# Patient Record
Sex: FEMALE | URBAN - METROPOLITAN AREA
[De-identification: names, ages, dates, MRNs, and addresses within clinical notes are randomized per-mention and may not be internally consistent; named-entity substitution may affect disease eponyms.]

---

## 2021-01-25 ENCOUNTER — HOSPITAL ENCOUNTER (OUTPATIENT)
Age: 36
Setting detail: SPECIMEN
Discharge: HOME | End: 2021-01-25
Payer: COMMERCIAL

## 2021-01-25 ENCOUNTER — OFFICE VISIT (OUTPATIENT)
Dept: OBGYN CLINIC | Age: 36
End: 2021-01-25
Payer: COMMERCIAL

## 2021-01-25 VITALS — SYSTOLIC BLOOD PRESSURE: 116 MMHG | HEART RATE: 68 BPM | WEIGHT: 154.5 LBS | DIASTOLIC BLOOD PRESSURE: 73 MMHG

## 2021-01-25 DIAGNOSIS — Z86.19 HISTORY OF HEPATITIS A: ICD-10-CM

## 2021-01-25 DIAGNOSIS — Z86.718 HISTORY OF BLOOD CLOTS: ICD-10-CM

## 2021-01-25 DIAGNOSIS — Z11.3 SCREENING FOR STD (SEXUALLY TRANSMITTED DISEASE): ICD-10-CM

## 2021-01-25 DIAGNOSIS — Z87.891 HISTORY OF SMOKING: ICD-10-CM

## 2021-01-25 DIAGNOSIS — I87.2 VENOUS INSUFFICIENCY OF BOTH LOWER EXTREMITIES: ICD-10-CM

## 2021-01-25 DIAGNOSIS — Z01.419 WOMEN'S ANNUAL ROUTINE GYNECOLOGICAL EXAMINATION: Primary | ICD-10-CM

## 2021-01-25 PROCEDURE — 99385 PREV VISIT NEW AGE 18-39: CPT | Performed by: ADVANCED PRACTICE MIDWIFE

## 2021-01-25 PROCEDURE — G8484 FLU IMMUNIZE NO ADMIN: HCPCS | Performed by: ADVANCED PRACTICE MIDWIFE

## 2021-01-25 SDOH — HEALTH STABILITY: MENTAL HEALTH: HOW MANY STANDARD DRINKS CONTAINING ALCOHOL DO YOU HAVE ON A TYPICAL DAY?: NOT ASKED

## 2021-01-25 SDOH — ECONOMIC STABILITY: FOOD INSECURITY: WITHIN THE PAST 12 MONTHS, THE FOOD YOU BOUGHT JUST DIDN'T LAST AND YOU DIDN'T HAVE MONEY TO GET MORE.: NEVER TRUE

## 2021-01-25 SDOH — ECONOMIC STABILITY: TRANSPORTATION INSECURITY
IN THE PAST 12 MONTHS, HAS THE LACK OF TRANSPORTATION KEPT YOU FROM MEDICAL APPOINTMENTS OR FROM GETTING MEDICATIONS?: NO

## 2021-01-25 ASSESSMENT — PATIENT HEALTH QUESTIONNAIRE - PHQ9
SUM OF ALL RESPONSES TO PHQ QUESTIONS 1-9: 0
SUM OF ALL RESPONSES TO PHQ9 QUESTIONS 1 & 2: 0
1. LITTLE INTEREST OR PLEASURE IN DOING THINGS: 0

## 2021-01-25 ASSESSMENT — ENCOUNTER SYMPTOMS
ALLERGIC/IMMUNOLOGIC NEGATIVE: 1
EYES NEGATIVE: 1
GASTROINTESTINAL NEGATIVE: 1
RESPIRATORY NEGATIVE: 1

## 2021-01-25 NOTE — PROGRESS NOTES
Date of Visit: 1/25/2021    SUBJECTIVE:  Chief Complaint   Patient presents with    New Patient       HPI  Fady Vargas arrives for annual Well Woman exam.  Patient has concerns today regarding her most recent period that was 42 days from her last and caused her to have frequent decreases in BP, pain, cramping, swelling and headaches. She primarily desires to establish care today. Her Patient's last menstrual period was 01/19/2021 (exact date). .   She complains of irregular/heavy bleeding and dysmenorrhea. Her periods are regular, every 26 days and last 4 days. She describes them as moderate. Her bowel habits are regular. She denies any bloating. She denies dysuria. She denies urinary leaking. She complains of vaginal discharge that was malodorous to the point that she had to change clothes afterwards. This happened about a month ago and is not currently happening. No changes with urinary frequency/urgency, dysuria. She is not sexually active. Reports a divorce from her  in 2018, no sexual activity in a while and does not plan on it in the near future. She does not currently use contraception and is not planning on becoming pregnant. Review of Systems   Constitutional: Negative. HENT: Negative. Eyes: Negative. Respiratory: Negative. Cardiovascular: Negative. Gastrointestinal: Negative. Endocrine: Negative. Genitourinary: Positive for menstrual problem and vaginal discharge. Musculoskeletal: Negative. Skin: Negative. Allergic/Immunologic: Negative. Neurological: Negative. Hematological:        Varicosities   Psychiatric/Behavioral: Negative. PREVENTIVE HEALTH SCREENING:   Date of last pap:  2017?   Patient unsure of results/date             HPV typing/date: unknown    Date of last mammogram: patient believes it was around 5 years ago due to some fibrocystic changes   Date of last DEXA scan: N/A  Date of last colonoscopy: N/A    Preventive screening through PCP: Sees a PCP but has not had screening    Family history of Breast, Ovarian, Colon or Uterine Cancer: No     See updated review in Media     Genetic counseling:  Declines      GYNECOLOGIC HISTORY:  Menarche: Teens    STD history: No     Contraception: None  Permanent sterilization: No      Physical Exam:     Constitutional:   Blood pressure 116/73, pulse 68, weight 154 lb 8 oz (70.1 kg), last menstrual period 01/19/2021. Wt Readings from Last 3 Encounters:   01/25/21 154 lb 8 oz (70.1 kg)       General Appearance: This is a well-developed, well-nourished and well-groomed female. Alert and not in distress  Skin:  Inspection of the skin revealed no rashes or lesions  Neck and EENT:  No eye discharge and sclera non-icteric  Lips, teeth and gums without lesions and normal dentition  Nares are patent without discharge  Normal external ears are present with no hearing loss  The neck was supple with full range of motion and no masses. The thyroid was not enlarged and had no masses. No enlarged cervical lymph nodes  Lymphatic:   No lymph nodes were palpable in neck, axilla or groin   Neurologic:    Normal speech, no focal findings or movement disorder noted  Respiratory: The lungs were clear to auscultation bilaterally. There were no rales, rhonchi or wheezes. There was good respiratory effort. Cardiovascular: The heart was in a regular rhythm and rate was normal.  No murmur or extra sounds were noted. Breast:  The breasts are normal size and symmetrical.  There are no skin changes with position changes. The nipples are without deviations or discharge. No masses were palpated. No axillary or supraclavicular lymphadenopathy is present. Back:  Straight with no CVA tenderness present  Abdomen: The abdomen is soft and non-tender. There was no guarding, rebound or rigidity. The bladder was without fullness or tenderness. No hernias were appreciated. Pelvic:   The external genitalia has a normal appearance without masses or lesions. There is no inguinal lymphadenopathy. Bladder/urethra without discharge or mass. Normal urethra. The vagina is pink and well rugated. The cervix is without lesions, small amount of yellowish, non-malodorous discharge and with no CMT. Pap was obtained without difficulty. Rectum is normal.  Musculoskeletal:   Normal gait. No contractions with normal movement of all extremities. Range of motion appropriate for patient's age. Extremities:  No cyanosis or edema present. No calf tenderness  Neurologic:    Normal speech, no focal findings or movement disorder noted  Psychiatric:  Alert, oriented to time, place, person and situation. There are no mood or affect changes. ASSESSMENT/PLAN:  1. Women's annual routine gynecological examination  AGE <39 Counseling and Evaluation  Sexuality/Reproductive Planning  [x] Discussed birth control as needed, no plans for pregnancy  [x] Reproductive plan discussed  [] Preconception/genetic counseling  [x] Sexual function  [x] Discussed STI counseling/prevention, STI panel done today  [] Discussed Gardisil   Fitness/Nutrition  [x] Physical activity  [] Folic Acid supplementation discussed  Health/Risk Assessment  [x] Discussed annual Well-Woman exams every year; Pap per ASCCP guidelines and testing  [x] Breast self-awareness reinforced  [x] Hereditary Breast/Ovarian Cancer screening,    [x] Hepatitis C screening, done today  [x] Tobacco & Secondary smoke risks; with recommendation for cessation and avoidance. Quit 3 years ago, no concerns with maintenance  [x] Health maintenance through PCP  Psychosocial Evaluation  [] Intimate partner violence screening  [] Depression/Anxiety screening  Cardiovascular Risk Factors  [] Family history  [] Hypertension  [] Dyslipidemia  [] Obesity  [x] Diabetes Mellitus  [] Personal hx of PreE, GDM, or PIH  [] Lifestyle  - CBC; Future  - Comprehensive Metabolic Panel;  Future  - Hemoglobin A1C; Future  - TSH without Reflex; Future  - T4, Free; Future  - Lipid Panel; Future  - Chlamydia/GC DNA, Urine; Future  - Hepatitis B Surface Antibody; Future  - Hepatitis C Antibody; Future  - T. pallidum Ab; Future  - HIV Screen; Future  - Vaginitis DNA Probe; Future  - PAP SMEAR; Future  - Discussed monitoring cycle, report any further worsening/issues    2. Screening for STD (sexually transmitted disease)  - Chlamydia/GC DNA, Urine; Future  - Hepatitis B Surface Antibody; Future  - Hepatitis C Antibody; Future  - T. pallidum Ab; Future  - HIV Screen; Future  - Vaginitis DNA Probe; Future  - Chlamydia/GC DNA, Thin Prep; Future    3. History of hepatitis A    4. Venous insufficiency of both lower extremities    5. History of blood clots    6. History of smoking        The patient, Cole Delarosa,  was seen with a total time spent of 30 minutes for the visit on this date of service by the [facility]  The time component, involved both face-to-face (counseling and education)  and non face-to-face time (care coordination), spent in determining the total time component. She was also counseled on her preventative health maintenance recommendations and follow-up.     Electronically Signed by ALISE Bolanos CNM

## 2021-01-26 ENCOUNTER — PATIENT MESSAGE (OUTPATIENT)
Dept: OBGYN CLINIC | Age: 36
End: 2021-01-26

## 2021-01-26 DIAGNOSIS — Z11.3 SCREENING FOR STD (SEXUALLY TRANSMITTED DISEASE): ICD-10-CM

## 2021-01-26 DIAGNOSIS — Z01.419 WOMEN'S ANNUAL ROUTINE GYNECOLOGICAL EXAMINATION: ICD-10-CM

## 2021-01-26 DIAGNOSIS — N76.0 GARDNERELLA VAGINALIS INFECTION: Primary | ICD-10-CM

## 2021-01-26 DIAGNOSIS — B96.89 GARDNERELLA VAGINALIS INFECTION: Primary | ICD-10-CM

## 2021-01-26 LAB
DIRECT EXAM: ABNORMAL
Lab: ABNORMAL
SPECIMEN DESCRIPTION: ABNORMAL

## 2021-01-26 RX ORDER — METRONIDAZOLE 7.5 MG/G
GEL VAGINAL
Qty: 1 TUBE | Refills: 1 | Status: SHIPPED | OUTPATIENT
Start: 2021-01-26 | End: 2021-02-02

## 2021-01-26 NOTE — TELEPHONE ENCOUNTER
From: ALISE Singer CNM  To: Olayinka Jurado  Sent: 1/26/2021 1:42 PM EST  Subject: results    Nice seeing you yesterday! Letting you know, you do have a vaginal infection. I have medicine that you place in the vagina. It is a little messy but much better. I have the [facility] in [place] as the pharmacy. I wanted to confirm that that is the correct pharmacy to send to, since you said you live in [place].   Let me know,  neal

## 2021-01-27 LAB
CHLAMYDIA BY THIN PREP: NEGATIVE
N. GONORRHOEAE DNA, THIN PREP: NEGATIVE

## 2021-01-28 LAB
HPV SAMPLE: NORMAL
HPV, GENOTYPE 16: NOT DETECTED
HPV, GENOTYPE 18: NOT DETECTED
HPV, HIGH RISK OTHER: NOT DETECTED
HPV, INTERPRETATION: NORMAL
SPECIMEN DESCRIPTION: NORMAL

## 2021-02-03 LAB — CYTOLOGY REPORT: NORMAL

## 2021-02-04 ENCOUNTER — HOSPITAL ENCOUNTER (OUTPATIENT)
Age: 36
Setting detail: SPECIMEN
Discharge: HOME | End: 2021-02-04
Payer: COMMERCIAL

## 2021-02-04 DIAGNOSIS — Z11.3 SCREENING FOR STD (SEXUALLY TRANSMITTED DISEASE): ICD-10-CM

## 2021-02-04 DIAGNOSIS — Z01.419 WOMEN'S ANNUAL ROUTINE GYNECOLOGICAL EXAMINATION: ICD-10-CM

## 2021-02-04 LAB
HCT VFR BLD CALC: 41 % (ref 36.3–47.1)
HEMOGLOBIN: 12.8 G/DL (ref 11.9–15.1)
MCH RBC QN AUTO: 31.1 PG (ref 25.2–33.5)
MCHC RBC AUTO-ENTMCNC: 31.2 G/DL (ref 28.4–34.8)
MCV RBC AUTO: 99.5 FL (ref 82.6–102.9)
NRBC AUTOMATED: 0 PER 100 WBC
PDW BLD-RTO: 13 % (ref 11.8–14.4)
PLATELET # BLD: 219 K/UL (ref 138–453)
PMV BLD AUTO: 10.8 FL (ref 8.1–13.5)
RBC # BLD: 4.12 M/UL (ref 3.95–5.11)
T. PALLIDUM, IGG: NONREACTIVE
WBC # BLD: 5.2 K/UL (ref 3.5–11.3)

## 2021-02-05 LAB
ALBUMIN SERPL-MCNC: 4.2 G/DL (ref 3.5–5.2)
ALBUMIN/GLOBULIN RATIO: 1.4 (ref 1–2.5)
ALP BLD-CCNC: 36 U/L (ref 35–104)
ALT SERPL-CCNC: 20 U/L (ref 5–33)
ANION GAP SERPL CALCULATED.3IONS-SCNC: 10 MMOL/L (ref 9–17)
AST SERPL-CCNC: 28 U/L
BILIRUB SERPL-MCNC: 0.38 MG/DL (ref 0.3–1.2)
BUN BLDV-MCNC: 11 MG/DL (ref 6–20)
BUN/CREAT BLD: ABNORMAL (ref 9–20)
CALCIUM SERPL-MCNC: 9.5 MG/DL (ref 8.6–10.4)
CHLORIDE BLD-SCNC: 106 MMOL/L (ref 98–107)
CHOLESTEROL/HDL RATIO: 2
CHOLESTEROL: 161 MG/DL
CO2: 22 MMOL/L (ref 20–31)
CREAT SERPL-MCNC: 0.62 MG/DL (ref 0.5–0.9)
ESTIMATED AVERAGE GLUCOSE: 100 MG/DL
GFR AFRICAN AMERICAN: >60 ML/MIN
GFR NON-AFRICAN AMERICAN: >60 ML/MIN
GFR SERPL CREATININE-BSD FRML MDRD: ABNORMAL ML/MIN/{1.73_M2}
GFR SERPL CREATININE-BSD FRML MDRD: ABNORMAL ML/MIN/{1.73_M2}
GLUCOSE BLD-MCNC: 67 MG/DL (ref 70–99)
HBA1C MFR BLD: 5.1 % (ref 4–6)
HBV SURFACE AB TITR SER: <3.5 MIU/ML
HDLC SERPL-MCNC: 81 MG/DL
HEPATITIS C ANTIBODY: NONREACTIVE
HIV AG/AB: NONREACTIVE
LDL CHOLESTEROL: 69 MG/DL (ref 0–130)
POTASSIUM SERPL-SCNC: 4.1 MMOL/L (ref 3.7–5.3)
SODIUM BLD-SCNC: 138 MMOL/L (ref 135–144)
THYROXINE, FREE: 1.17 NG/DL (ref 0.93–1.7)
TOTAL PROTEIN: 7.2 G/DL (ref 6.4–8.3)
TRIGL SERPL-MCNC: 57 MG/DL
TSH SERPL DL<=0.05 MIU/L-ACNC: 3.83 MIU/L (ref 0.3–5)
VLDLC SERPL CALC-MCNC: NORMAL MG/DL (ref 1–30)